# Patient Record
Sex: FEMALE | Race: WHITE
[De-identification: names, ages, dates, MRNs, and addresses within clinical notes are randomized per-mention and may not be internally consistent; named-entity substitution may affect disease eponyms.]

---

## 2022-04-03 ENCOUNTER — HOSPITAL ENCOUNTER (EMERGENCY)
Dept: HOSPITAL 19 - COL.ER | Age: 52
Discharge: HOME | End: 2022-04-03
Attending: PERSONAL EMERGENCY RESPONSE ATTENDANT
Payer: COMMERCIAL

## 2022-04-03 VITALS — DIASTOLIC BLOOD PRESSURE: 100 MMHG | SYSTOLIC BLOOD PRESSURE: 130 MMHG | TEMPERATURE: 97.8 F

## 2022-04-03 VITALS — HEIGHT: 67.01 IN | WEIGHT: 142.2 LBS | BODY MASS INDEX: 22.32 KG/M2

## 2022-04-03 DIAGNOSIS — S01.01XA: ICD-10-CM

## 2022-04-03 DIAGNOSIS — F10.129: ICD-10-CM

## 2022-04-03 DIAGNOSIS — R41.3: ICD-10-CM

## 2022-04-03 DIAGNOSIS — S09.90XA: Primary | ICD-10-CM
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
## 2022-04-19 ENCOUNTER — HOSPITAL ENCOUNTER (OUTPATIENT)
Dept: HOSPITAL 19 - MC.RAD | Age: 52
End: 2022-04-19
Attending: FAMILY MEDICINE
Payer: COMMERCIAL

## 2022-04-19 DIAGNOSIS — Z12.31: Primary | ICD-10-CM

## 2023-10-16 ENCOUNTER — HOSPITAL ENCOUNTER (EMERGENCY)
Dept: HOSPITAL 19 - COL.ER | Age: 53
Discharge: HOME | End: 2023-10-16
Attending: EMERGENCY MEDICINE
Payer: COMMERCIAL

## 2023-10-16 VITALS — TEMPERATURE: 97.5 F

## 2023-10-16 VITALS — HEIGHT: 67.01 IN | WEIGHT: 146.39 LBS | BODY MASS INDEX: 22.98 KG/M2

## 2023-10-16 VITALS — SYSTOLIC BLOOD PRESSURE: 128 MMHG | HEART RATE: 68 BPM | DIASTOLIC BLOOD PRESSURE: 89 MMHG

## 2023-10-16 DIAGNOSIS — N39.0: Primary | ICD-10-CM

## 2023-10-16 DIAGNOSIS — Z28.310: ICD-10-CM

## 2023-10-16 LAB
ALBUMIN SERPL-MCNC: 4.1 GM/DL (ref 3.5–5)
ALP SERPL-CCNC: 61 U/L (ref 40–150)
ALT SERPL-CCNC: 17 U/L (ref 0–55)
ANION GAP SERPL CALC-SCNC: 10 MMOL/L (ref 7–16)
AST SERPL-CCNC: 18 U/L (ref 5–34)
BASOPHILS # BLD: 0.1 K/MM3 (ref 0–0.2)
BASOPHILS NFR BLD AUTO: 0.6 % (ref 0–2)
BILIRUB SERPL-MCNC: 0.6 MG/DL (ref 0.2–1.2)
BUN SERPL-MCNC: 12 MG/DL (ref 10–20)
CALCIUM SERPL-MCNC: 9.4 MG/DL (ref 8.4–10.2)
CHLORIDE SERPL-SCNC: 109 MMOL/L (ref 98–107)
CO2 SERPL-SCNC: 23 MMOL/L (ref 22–29)
CREAT SERPL-SCNC: 0.66 MG/DL (ref 0.57–1.11)
EOSINOPHIL # BLD: 0.4 K/MM3 (ref 0–0.7)
EOSINOPHIL NFR BLD: 4.3 % (ref 0–4)
ERYTHROCYTE [DISTWIDTH] IN BLOOD BY AUTOMATED COUNT: 11.9 % (ref 11.5–14.5)
GLUCOSE SERPL-MCNC: 88 MG/DL (ref 70–99)
GRANULOCYTES # BLD AUTO: 57.9 % (ref 42.2–75.2)
HCT VFR BLD AUTO: 37.8 % (ref 37–47)
HGB BLD-MCNC: 13 G/DL (ref 12.5–16)
LYMPHOCYTES # BLD: 2.2 K/MM3 (ref 1.2–3.4)
LYMPHOCYTES NFR BLD: 27 % (ref 20–51)
MCH RBC QN AUTO: 32 PG (ref 27–31)
MCHC RBC AUTO-ENTMCNC: 34 G/DL (ref 33–37)
MCV RBC AUTO: 93 FL (ref 80–100)
MONOCYTES # BLD: 0.8 K/MM3 (ref 0.1–0.6)
MONOCYTES NFR BLD AUTO: 10 % (ref 1.7–9.3)
NEUTROPHILS # BLD: 4.7 K/MM3 (ref 1.4–6.5)
PH UR STRIP.AUTO: 5 [PH] (ref 5–8.5)
PLATELET # BLD AUTO: 256 K/MM3 (ref 130–400)
PMV BLD AUTO: 10 FL (ref 7.4–10.4)
POTASSIUM SERPL-SCNC: 4.2 MMOL/L (ref 3.5–4.5)
PROT SERPL-MCNC: 6.8 GM/DL (ref 6.2–8.1)
RBC # BLD AUTO: 4.07 M/MM3 (ref 4.1–5.3)
SODIUM SERPL-SCNC: 142 MMOL/L (ref 136–145)
SP GR UR STRIP.AUTO: 1.02 (ref 1–1.03)
SQUAMOUS # URNS: (no result) /HPF (ref 0–10)
URN COLLECT METHOD CLASS: (no result)
UROBILINOGEN UR STRIP.AUTO-MCNC: 0.2 E.U/DL (ref 0.2–1)

## 2024-09-09 ENCOUNTER — HOSPITAL ENCOUNTER (OUTPATIENT)
Dept: HOSPITAL 19 - MC.RAD | Age: 54
End: 2024-09-09
Attending: FAMILY MEDICINE
Payer: COMMERCIAL

## 2024-09-09 DIAGNOSIS — N63.21: Primary | ICD-10-CM

## 2024-10-08 ENCOUNTER — HOSPITAL ENCOUNTER (OUTPATIENT)
Dept: HOSPITAL 19 - SDCO | Age: 54
Discharge: HOME | End: 2024-10-08
Attending: SURGERY
Payer: COMMERCIAL

## 2024-10-08 VITALS — SYSTOLIC BLOOD PRESSURE: 147 MMHG | TEMPERATURE: 97.7 F | DIASTOLIC BLOOD PRESSURE: 93 MMHG | HEART RATE: 76 BPM

## 2024-10-08 VITALS — DIASTOLIC BLOOD PRESSURE: 88 MMHG | HEART RATE: 68 BPM | SYSTOLIC BLOOD PRESSURE: 143 MMHG

## 2024-10-08 VITALS — SYSTOLIC BLOOD PRESSURE: 126 MMHG | DIASTOLIC BLOOD PRESSURE: 84 MMHG | HEART RATE: 68 BPM

## 2024-10-08 VITALS — BODY MASS INDEX: 23.46 KG/M2 | WEIGHT: 145.95 LBS | HEIGHT: 66 IN

## 2024-10-08 VITALS — DIASTOLIC BLOOD PRESSURE: 83 MMHG | TEMPERATURE: 97.6 F | HEART RATE: 67 BPM | SYSTOLIC BLOOD PRESSURE: 138 MMHG

## 2024-10-08 VITALS — SYSTOLIC BLOOD PRESSURE: 121 MMHG | DIASTOLIC BLOOD PRESSURE: 72 MMHG | HEART RATE: 68 BPM

## 2024-10-08 DIAGNOSIS — Z87.891: ICD-10-CM

## 2024-10-08 DIAGNOSIS — Z98.82: ICD-10-CM

## 2024-10-08 DIAGNOSIS — C50.912: Primary | ICD-10-CM

## 2024-10-08 PROCEDURE — A4648 IMPLANTABLE TISSUE MARKER: HCPCS

## 2024-10-08 PROCEDURE — C1788 PORT, INDWELLING, IMP: HCPCS

## 2024-10-08 NOTE — NUR
1545  RETURNS TO ROOM 7 PER CART WITH HOB ELEVATED 60 DEGREES.  AWAKE, ALERT.
RESP UNLABORED.  PORT PLACEMENT INCISIONS RIGHT UPPER CHEST AND NECK INTACT,
WITHOUT REDNESS, EDEMA OR DRAINAGE.  LEFT BREAST AND LEFT AXILLA INCISION EACH
INTACT.  SURGICAL AREA SOFT WITH GENTLE PALPATION.  NO REDNESS EDEMA OR
DRAINAGE.  ICE PACK LEFT BREAST/AXILLA AREA.  REPORTS MILD DISCOMFORT.  DENIES
NEED FOR PAIN MED
1600 AMBULATES TO BATHROOM WITH STANDBY ASSIST
1615 FRIENDS IN ROOM
1635 SIGNIFICANT OTHER IN ROOM.  DISCHARGE INSTRUCTIONS REVIEWED.  PATIENT
VERBALIZES UNDERSTANDING.  COPY PROVIDED IN DISCHARGE FOLDER
5841 DRESSES SELF

## 2024-10-29 ENCOUNTER — HOSPITAL ENCOUNTER (OUTPATIENT)
Dept: HOSPITAL 19 - COL.VAS | Age: 54
End: 2024-10-29
Payer: COMMERCIAL

## 2024-10-29 DIAGNOSIS — Z51.11: Primary | ICD-10-CM

## 2024-10-29 DIAGNOSIS — I34.0: ICD-10-CM

## 2024-10-29 DIAGNOSIS — C50.412: ICD-10-CM
